# Patient Record
Sex: FEMALE | Race: AMERICAN INDIAN OR ALASKA NATIVE
[De-identification: names, ages, dates, MRNs, and addresses within clinical notes are randomized per-mention and may not be internally consistent; named-entity substitution may affect disease eponyms.]

---

## 2018-09-19 ENCOUNTER — HOSPITAL ENCOUNTER (EMERGENCY)
Dept: HOSPITAL 5 - ED | Age: 73
LOS: 1 days | Discharge: PSYCHIATRIC OR SUBSTANCE USE FACILITY | End: 2018-09-20
Payer: MEDICARE

## 2018-09-19 DIAGNOSIS — F29: Primary | ICD-10-CM

## 2018-09-19 LAB
BASOPHILS # (AUTO): 0 K/MM3 (ref 0–0.1)
BASOPHILS NFR BLD AUTO: 1.4 % (ref 0–1.8)
BILIRUB UR QL STRIP: (no result)
BLOOD UR QL VISUAL: (no result)
BUN SERPL-MCNC: 41 MG/DL (ref 7–17)
BUN/CREAT SERPL: 27 %
CALCIUM SERPL-MCNC: 9.4 MG/DL (ref 8.4–10.2)
EOSINOPHIL # BLD AUTO: 0 K/MM3 (ref 0–0.4)
EOSINOPHIL NFR BLD AUTO: 0.5 % (ref 0–4.3)
HCT VFR BLD CALC: 37 % (ref 30.3–42.9)
HEMOLYSIS INDEX: 15
HGB BLD-MCNC: 12.5 GM/DL (ref 10.1–14.3)
LYMPHOCYTES # BLD AUTO: 1 K/MM3 (ref 1.2–5.4)
LYMPHOCYTES NFR BLD AUTO: 34 % (ref 13.4–35)
MCH RBC QN AUTO: 31 PG (ref 28–32)
MCHC RBC AUTO-ENTMCNC: 34 % (ref 30–34)
MCV RBC AUTO: 92 FL (ref 79–97)
MONOCYTES # (AUTO): 0.3 K/MM3 (ref 0–0.8)
MONOCYTES % (AUTO): 9.4 % (ref 0–7.3)
MUCOUS THREADS #/AREA URNS HPF: (no result) /HPF
PH UR STRIP: 5 [PH] (ref 5–7)
PLATELET # BLD: 197 K/MM3 (ref 140–440)
PROT UR STRIP-MCNC: (no result) MG/DL
RBC # BLD AUTO: 4.04 M/MM3 (ref 3.65–5.03)
RBC #/AREA URNS HPF: < 1 /HPF (ref 0–6)
UROBILINOGEN UR-MCNC: < 2 MG/DL (ref ?–2)
WBC #/AREA URNS HPF: 2 /HPF (ref 0–6)

## 2018-09-19 PROCEDURE — 36415 COLL VENOUS BLD VENIPUNCTURE: CPT

## 2018-09-19 PROCEDURE — 81001 URINALYSIS AUTO W/SCOPE: CPT

## 2018-09-19 PROCEDURE — 80307 DRUG TEST PRSMV CHEM ANLYZR: CPT

## 2018-09-19 PROCEDURE — 84443 ASSAY THYROID STIM HORMONE: CPT

## 2018-09-19 PROCEDURE — G0480 DRUG TEST DEF 1-7 CLASSES: HCPCS

## 2018-09-19 PROCEDURE — 85025 COMPLETE CBC W/AUTO DIFF WBC: CPT

## 2018-09-19 PROCEDURE — 99285 EMERGENCY DEPT VISIT HI MDM: CPT

## 2018-09-19 PROCEDURE — 80320 DRUG SCREEN QUANTALCOHOLS: CPT

## 2018-09-19 PROCEDURE — 80048 BASIC METABOLIC PNL TOTAL CA: CPT

## 2018-09-19 NOTE — EMERGENCY DEPARTMENT REPORT
ED General Adult HPI





- General


Chief complaint: Psych


Stated complaint: SUICIDAL/AGGRESSIVE


Time Seen by Provider: 09/19/18 14:18


Source: patient


Mode of arrival: Ambulatory


Limitations: No Limitations





- History of Present Illness


Initial comments: 


72-year-old female with past medical history of bipolar disorder and 

schizophrenia presents after being brought in by her caretaker.  Caretaker 

stated that the patient has been combative to multiple individuals in the 

household.  According to the caregiver the patient has been combative towards 

other individuals with multiple belts within the household.  Patient has also 

been beating the dog according to the caregiver.  Patient lives in a group home 

name [facility].





When asked directly patient denies any suicidal or homicidal ideation.  Patient 

denies any auditory hallucinations.  She states she has not been combative and 

denies any other accounts that were given by the caretaker with this current 

time is not present.





I was able to speak to caregiver who states that for the past four days patient 

has been more aggressive and has not been sleeping. Patient has been in care of 

[facility] for five years and she has never been in this condition. 

Caregiver states that patient has been cursing, and threatening to kill 

everybody in house. Patient has not been eating and has been walking around the 

house stating that "you dead" and " you going to die". 





- Related Data


 Home Medications











 Medication  Instructions  Recorded  Confirmed  Last Taken


 


Donepezil [Aricept] 5 mg PO QHS 09/19/18 09/19/18 Unknown


 


Levothyroxine [Synthroid] 25 mcg PO QAM 09/19/18 09/19/18 Unknown


 


Lisinopril 20 mg PO DAILY 09/19/18 09/19/18 Unknown


 


hydroCHLOROthiazide [HCTZ] 25 mg PO QDAY 09/19/18 09/19/18 Unknown


 


traZODone [Desyrel] 50 mg PO QHS 09/19/18 09/19/18 Unknown











 Allergies











Allergy/AdvReac Type Severity Reaction Status Date / Time


 


Unable to Assess Allergy   Unverified 09/19/18 13:35














ED Review of Systems


ROS: 


Stated complaint: SUICIDAL/AGGRESSIVE


Other details as noted in HPI





Constitutional: denies: chills, fever


Eyes: denies: eye pain, eye discharge, vision change


ENT: denies: ear pain, throat pain


Respiratory: denies: cough, shortness of breath, wheezing


Cardiovascular: denies: chest pain, palpitations


Endocrine: no symptoms reported


Gastrointestinal: denies: abdominal pain, nausea, diarrhea


Genitourinary: denies: urgency, dysuria, discharge


Musculoskeletal: denies: back pain, joint swelling, arthralgia


Skin: denies: rash, lesions


Neurological: denies: headache, weakness, paresthesias


Psychiatric: denies: anxiety, depression


Hematological/Lymphatic: denies: easy bleeding, easy bruising





ED Past Medical Hx





- Past Medical History


Hx Psychiatric Treatment: Yes





- Social History


Smoking Status: Never Smoker





- Medications


Home Medications: 


 Home Medications











 Medication  Instructions  Recorded  Confirmed  Last Taken  Type


 


Donepezil [Aricept] 5 mg PO QHS 09/19/18 09/19/18 Unknown History


 


Levothyroxine [Synthroid] 25 mcg PO QAM 09/19/18 09/19/18 Unknown History


 


Lisinopril 20 mg PO DAILY 09/19/18 09/19/18 Unknown History


 


hydroCHLOROthiazide [HCTZ] 25 mg PO QDAY 09/19/18 09/19/18 Unknown History


 


traZODone [Desyrel] 50 mg PO QHS 09/19/18 09/19/18 Unknown History














ED Physical Exam





- General


Limitations: No Limitations


General appearance: alert, in no apparent distress, other (eating food without 

discomfort)





- Head


Head exam: Present: atraumatic, normocephalic





- Eye


Eye exam: Present: normal appearance





- ENT


ENT exam: Present: mucous membranes moist





- Neck


Neck exam: Present: normal inspection





- Respiratory


Respiratory exam: Present: normal lung sounds bilaterally.  Absent: respiratory 

distress





- Cardiovascular


Cardiovascular Exam: Present: regular rate, normal rhythm.  Absent: systolic 

murmur, diastolic murmur, rubs, gallop





- GI/Abdominal


GI/Abdominal exam: Present: soft, normal bowel sounds





- Extremities Exam


Extremities exam: Present: normal inspection





- Back Exam


Back exam: Present: normal inspection





- Neurological Exam


Neurological exam: Present: alert, oriented X3, CN II-XII intact, other (

patient moves all extremities without difficulty)





- Psychiatric


Psychiatric exam: Present: normal affect, normal mood





- Skin


Skin exam: Present: warm, dry, intact, normal color.  Absent: rash





ED Course


 Vital Signs











  09/19/18





  13:20


 


Temperature 97.6 F


 


Pulse Rate 95 H


 


Blood Pressure 155/79


 


O2 Sat by Pulse 96





Oximetry 














ED Medical Decision Making





- Lab Data


Result diagrams: 


 09/19/18 13:52





 09/19/18 13:52





- Medical Decision Making


Patient placed on a 1013.  patient evaluated by mental health as well.  She 

currently is awaiting possible transfer to a geriatric psychiatric facility, 

possibly [facility], [place], or [facility]. Patient is tolerating liquid and is 

otherwise medically clear. 


Critical care attestation.: 


If time is entered above; I have spent that time in minutes in the direct care 

of this critically ill patient, excluding procedure time.








ED Disposition


Clinical Impression: 


 Psychosis





Disposition: DC/[facility] PSY HOSP/PSY UNIT


Is pt being admited?: No


Condition: Stable


Referrals: 


PRIMARY CARE,MD [Primary Care Provider] - 3-5 Days


Time of Disposition: 20:42


Print Language: ENGLISH

## 2018-09-20 VITALS — DIASTOLIC BLOOD PRESSURE: 70 MMHG | SYSTOLIC BLOOD PRESSURE: 138 MMHG

## 2018-09-20 LAB
BENZODIAZEPINES SCREEN,URINE: (no result)
METHADONE SCREEN,URINE: (no result)
OPIATE SCREEN,URINE: (no result)

## 2018-09-20 NOTE — CONSULTATION
History of Present Illness





- Reason for Consult


Consult date: 09/20/18


Reason for consult: Mental Health Evaluation


Requesting physician: EDOUARD MCARTHUR





- Chief Complaint


Chief complaint: 


"Hello"








- History of Present Psychiatric Illness


72-year-old female with past medical history of bipolar disorder and 

schizophrenia presents after being brought in by her caretaker. Today the 

patient is calm, but confused during the assessment. She could not elaborate 

about what happened at her residence. She was not able to recall 3 numbers 

within 5 mins nor ID the current [place] President. Per collateral information from 

her caretaker Myriam Keita at [phone], she stated that the patient had not 

slept for several days and been aggressive toward staff. She stated that this 

is a new behavior by the patient. She stated that the patient takes Trazodone 

and Aricept. The patient is a poor historian at this time. Per the notes, no 

behavioral disturbances overnight. No gestures of SI/HI's. 








Medications and Allergies


 Allergies











Allergy/AdvReac Type Severity Reaction Status Date / Time


 


Unable to Assess Allergy   Unverified 09/19/18 13:35











 Home Medications











 Medication  Instructions  Recorded  Confirmed  Last Taken  Type


 


Donepezil [Aricept] 5 mg PO QHS 09/19/18 09/19/18 Unknown History


 


Levothyroxine [Synthroid] 25 mcg PO QAM 09/19/18 09/19/18 Unknown History


 


Lisinopril 20 mg PO DAILY 09/19/18 09/19/18 Unknown History


 


hydroCHLOROthiazide [HCTZ] 25 mg PO QDAY 09/19/18 09/19/18 Unknown History


 


traZODone [Desyrel] 50 mg PO QHS 09/19/18 09/19/18 Unknown History














Past psychiatric history





- Past Medical History


Past Medical History: other (Unable to obtain)


Past Surgical History: Other (Unable to obtain)





- past Psychiatric treatment and history


psychiatric treatment history: 


Unable to obtain a psy hx and a fam psy hx.








- Social History


Social history: other (Reside with a caregiver)





Mental Status Exam





- Vital signs


 Last Vital Signs











Temp  98.6 F   09/20/18 07:50


 


Pulse  76   09/20/18 07:50


 


Resp  16   09/20/18 07:50


 


BP  147/79   09/20/18 07:50


 


Pulse Ox  100   09/20/18 07:50














- Exam


Narrative exam: 


MSE:





 Appearance: calm, cooperative 


 Behavior: regular eye contact


 Speech: regular rate and tone


 Mood: "okay"


 Affect: congruent to mood


 Thought Process: confused                  


 Thought Content: denies SI/HI's and AVH's


 Motor Activity: ambulatory


 Cognition: alert


 Insight: poor 


 Judgment: variable  














Results


Result Diagrams: 


 09/19/18 13:52





 09/19/18 13:52


 Abnormal lab results











  09/19/18 09/19/18 09/19/18 Range/Units





  13:52 13:52 13:52 


 


WBC  3.1 L    (4.5-11.0)  K/mm3


 


RDW  12.7 L    (13.2-15.2)  %


 


Mono % (Auto)  9.4 H    (0.0-7.3)  %


 


Lymph #  1.0 L    (1.2-5.4)  K/mm3


 


Seg Neutrophils #  1.7 L    (1.8-7.7)  K/mm3


 


BUN   41 H   (7-17)  mg/dL


 


Creatinine   1.5 H   (0.7-1.2)  mg/dL


 


TSH    4.410 H  (0.270-4.200)  mlU/mL


 


Salicylates     (2.8-20.0)  mg/dL


 


Acetaminophen     (10.0-30.0)  ug/mL














  09/19/18 09/19/18 Range/Units





  13:52 13:52 


 


WBC    (4.5-11.0)  K/mm3


 


RDW    (13.2-15.2)  %


 


Mono % (Auto)    (0.0-7.3)  %


 


Lymph #    (1.2-5.4)  K/mm3


 


Seg Neutrophils #    (1.8-7.7)  K/mm3


 


BUN    (7-17)  mg/dL


 


Creatinine    (0.7-1.2)  mg/dL


 


TSH    (0.270-4.200)  mlU/mL


 


Salicylates  < 0.3 L   (2.8-20.0)  mg/dL


 


Acetaminophen   < 5.0 L  (10.0-30.0)  ug/mL








All other labs normal.








Assessment and Plan


Assessment and plan: 


Impression: Unspecified Neuro Cognitive DO. Today the patient is calm, but 

confused during the assessment. UDS have not been collected.





Recommendation/Plan: Continue 1013 with placement to inpatient psy services. 

Start Aricept 5 mg PO HS for dementia symptoms and Trazodone 50 mg PO HS for 

sleep. Attempted to discuss possible sucidality/medication induced cinda with 

the patient reference Trazodone. Recommend Delirium precautions below:





1. Frequently reorient patient and involve him/her in their care (simple 

explanations of procedures, tests, medications).


2. Lights on and shades open during daytime hours.


3. Write date and goals of care in a visible place.


4. Try to avoid unnecessary interruptions to sleep during nighttime hours.


5. Obtain glasses, hearing aids from home if patient uses these at baseline.


6. Avoid medications that may exacerbate delirium (especially narcotics, 

benzodiazepines, barbiturates, ambien, lunesta, and medications with excessive 

anticholinergic properties).

## 2018-09-20 NOTE — EMERGENCY DEPARTMENT REPORT
Blank Doc





- Documentation


Documentation: 





I was called to come see this patient who is awaiting psych placement.  These 

mobile assessors concerned that patient may have ringworm.  Patient does have a 

quarter-sized lesion on the left posterior scalp.  There is no hair coming from 

this region.  There is small papules in a ring pattern with some central 

erythema that looks like well granulated tissue.  This could represent a very 

dry ringworm but also could be just irritation from rubbing her head against 

something for long periods of time patient be started on griseofulvin 

empirically and the lesion will be watched.